# Patient Record
Sex: FEMALE | Race: ASIAN | NOT HISPANIC OR LATINO | URBAN - METROPOLITAN AREA
[De-identification: names, ages, dates, MRNs, and addresses within clinical notes are randomized per-mention and may not be internally consistent; named-entity substitution may affect disease eponyms.]

---

## 2021-08-05 ENCOUNTER — EMERGENCY (EMERGENCY)
Facility: HOSPITAL | Age: 21
LOS: 1 days | Discharge: HOME | End: 2021-08-05
Attending: EMERGENCY MEDICINE
Payer: COMMERCIAL

## 2021-08-05 VITALS
RESPIRATION RATE: 17 BRPM | TEMPERATURE: 98 F | HEIGHT: 62 IN | DIASTOLIC BLOOD PRESSURE: 68 MMHG | HEART RATE: 73 BPM | SYSTOLIC BLOOD PRESSURE: 116 MMHG | OXYGEN SATURATION: 100 % | WEIGHT: 110.01 LBS

## 2021-08-05 LAB — HCG UR QL: NEGATIVE — SIGNIFICANT CHANGE UP

## 2021-08-05 PROCEDURE — 99284 EMERGENCY DEPT VISIT MOD MDM: CPT

## 2021-08-05 RX ORDER — CEFTRIAXONE 500 MG/1
500 INJECTION, POWDER, FOR SOLUTION INTRAMUSCULAR; INTRAVENOUS ONCE
Refills: 0 | Status: COMPLETED | OUTPATIENT
Start: 2021-08-05 | End: 2021-08-05

## 2021-08-05 NOTE — ED PROVIDER NOTE - PHYSICAL EXAMINATION
Well Appearing, Nontoxic, NAD;  Symm Facies, PERRL 3mm, (-)Pallor, Anicteric, MMM;  RRR w/o m/g/r;   CTAB w/o w/r/r;   Abd soft, nt/nd, +bs;  No CVAT, nml external genitalia, purulent cervical discharge, (-)CMT/adnexa mass/tenderness;  No rash;  AOX3, Normal speech, normal strength/sensation/gait

## 2021-08-05 NOTE — ED PROVIDER NOTE - CLINICAL SUMMARY MEDICAL DECISION MAKING FREE TEXT BOX
------------ATTENDING NOTE------------  pt c/o 48 hrs of increased thick yellow vaginal discharge and mild midline pelvic ache, no fevers, complicated as recent unprotected vaginal intercourse, no fevers, exam w/ cervicitis and purulent uterine discharge, benign bimanual exam w/o CMT or adnexal mass/tenderness (resident Oren jennings), soft benign abd, no rash, no joint pain/swelling, empiric GC/CT tx, pt has close f/u tomorrow GYN, nml VS at SD, in depth dw pt about ddx, tx, mccormack, continued close outpt fu.  - Noble Aguero MD   ---------------------------------------------- ------------ATTENDING NOTE------------  pt c/o 48 hrs of increased thick yellow vaginal discharge and mild midline pelvic ache, no fevers, complicated as recent unprotected vaginal intercourse, no fevers, exam w/ cervicitis and purulent uterine discharge, benign bimanual exam w/o CMT or adnexal mass/tenderness (resident Oren jennings), soft benign abd, no rash, no joint pain/swelling, empiric GC/CT tx, no urinary symptoms and will await UCx (likely pyuria related to uterine discharge) , pt has close f/u tomorrow GYN, nml VS at SD, in depth dw pt about ddx, tx, mccormack, continued close outpt fu.  - Noble Aguero MD   ----------------------------------------------

## 2021-08-05 NOTE — ED PROVIDER NOTE - NSFOLLOWUPINSTRUCTIONS_ED_ALL_ED_FT
See your Gynecologist this week as scheduled for follow up -- call to discuss.    Use Acetaminophen and/or Ibuprofen as directed for pain -- see medication warnings.    Use Doxycycline as directed for infection -- see medication warnings.    See PELVIC PAIN information and return instructions given to you.    Seek immediate medical care for new/worsening symptoms/concerns.

## 2021-08-05 NOTE — ED PROVIDER NOTE - PATIENT PORTAL LINK FT
You can access the FollowMyHealth Patient Portal offered by [facility] by registering at the following website: [email or web address]. By joining [employer] [facility]’s FollowMyHealth portal, you will also be able to view your health information using other applications (apps) compatible with our system.

## 2021-08-05 NOTE — ED PROVIDER NOTE - RAPID ASSESSMENT
21y F with no PMHx, presents to the ED c/o pelvic cramping.  LMP July 22nd. Pt reports intermittent cramping s/p 400mg ibuprofen at 9pm. Was formerly constant. Pt denies cramping after menstrual period in the past. States she has been taking 800 mg ibuprofen daily for the pain. Denies dysuria, fevers, vaginal bleeding, abnormal vaginal discharge. No hx of STI/STD.     I, Radha Martinez (Scribe), have documented this rapid assessment note under the dictation of Lakisha Ventura (), which has been reviewed and affirmed to be accurate.  Patient was seen as a QDOC patient. The patient will be seen and further worked up in the main emergency department and their care will be completed by the main emergency department team along with a thorough physical exam. Receiving team will follow up on labs, analgesia, any clinical imaging, reassess and disposition as clinically indicated, all decisions regarding the progression of care will be made at their discretion. 21y F with no PMHx, presents to the ED c/o pelvic cramping.  LMP July 22nd. Pt reports intermittent cramping s/p 400mg ibuprofen at 9pm. Was formerly constant. Pt denies cramping after menstrual period in the past. States she has been taking a total of 800 mg ibuprofen daily for the pain, only 200mg at a time. Denies dysuria, fevers, vaginal bleeding, abnormal vaginal discharge. No hx of STI/STD.     IRadha (Ja), have documented this rapid assessment note under the dictation of Lakisha Ventura (), which has been reviewed and affirmed to be accurate.  Patient was seen as a QDOC patient. The patient will be seen and further worked up in the main emergency department and their care will be completed by the main emergency department team along with a thorough physical exam. Receiving team will follow up on labs, analgesia, any clinical imaging, reassess and disposition as clinically indicated, all decisions regarding the progression of care will be made at their discretion.    I,  , saw patient as a rapid assessment initially, rest of care performed by another attending, and rapid assessment documented by ja in my presence.

## 2021-08-06 VITALS
SYSTOLIC BLOOD PRESSURE: 116 MMHG | DIASTOLIC BLOOD PRESSURE: 93 MMHG | TEMPERATURE: 98 F | RESPIRATION RATE: 18 BRPM | OXYGEN SATURATION: 100 % | HEART RATE: 88 BPM

## 2021-08-06 LAB
APPEARANCE UR: CLEAR — SIGNIFICANT CHANGE UP
BACTERIA # UR AUTO: ABNORMAL
BILIRUB UR-MCNC: NEGATIVE — SIGNIFICANT CHANGE UP
COLOR SPEC: SIGNIFICANT CHANGE UP
DIFF PNL FLD: ABNORMAL
EPI CELLS # UR: 1 /HPF — SIGNIFICANT CHANGE UP
GLUCOSE UR QL: NEGATIVE — SIGNIFICANT CHANGE UP
HIV 1 & 2 AB SERPL IA.RAPID: SIGNIFICANT CHANGE UP
HYALINE CASTS # UR AUTO: 2 /LPF — SIGNIFICANT CHANGE UP (ref 0–2)
KETONES UR-MCNC: NEGATIVE — SIGNIFICANT CHANGE UP
LEUKOCYTE ESTERASE UR-ACNC: ABNORMAL
NITRITE UR-MCNC: NEGATIVE — SIGNIFICANT CHANGE UP
PH UR: 5.5 — SIGNIFICANT CHANGE UP (ref 5–8)
PROT UR-MCNC: NEGATIVE — SIGNIFICANT CHANGE UP
RBC CASTS # UR COMP ASSIST: 2 /HPF — SIGNIFICANT CHANGE UP (ref 0–4)
SP GR SPEC: 1.01 — SIGNIFICANT CHANGE UP (ref 1.01–1.02)
T PALLIDUM AB TITR SER: NEGATIVE — SIGNIFICANT CHANGE UP
UROBILINOGEN FLD QL: NEGATIVE — SIGNIFICANT CHANGE UP
WBC UR QL: 30 /HPF — HIGH (ref 0–5)

## 2021-08-06 PROCEDURE — 81001 URINALYSIS AUTO W/SCOPE: CPT

## 2021-08-06 PROCEDURE — 87491 CHLMYD TRACH DNA AMP PROBE: CPT

## 2021-08-06 PROCEDURE — 87591 N.GONORRHOEAE DNA AMP PROB: CPT

## 2021-08-06 PROCEDURE — 81025 URINE PREGNANCY TEST: CPT

## 2021-08-06 PROCEDURE — 87077 CULTURE AEROBIC IDENTIFY: CPT

## 2021-08-06 PROCEDURE — 99283 EMERGENCY DEPT VISIT LOW MDM: CPT | Mod: 25

## 2021-08-06 PROCEDURE — 96372 THER/PROPH/DIAG INJ SC/IM: CPT

## 2021-08-06 PROCEDURE — 86780 TREPONEMA PALLIDUM: CPT

## 2021-08-06 PROCEDURE — 87086 URINE CULTURE/COLONY COUNT: CPT

## 2021-08-06 PROCEDURE — 86703 HIV-1/HIV-2 1 RESULT ANTBDY: CPT

## 2021-08-06 PROCEDURE — 87186 SC STD MICRODIL/AGAR DIL: CPT

## 2021-08-06 RX ADMIN — CEFTRIAXONE 500 MILLIGRAM(S): 500 INJECTION, POWDER, FOR SOLUTION INTRAMUSCULAR; INTRAVENOUS at 00:13

## 2021-08-06 RX ADMIN — Medication 100 MILLIGRAM(S): at 00:12

## 2021-08-06 NOTE — ED ADULT NURSE NOTE - OBJECTIVE STATEMENT
21 year old female c/o pelvic pain. Pt A+Ox3, reports lower middle abdominal and pelvic pain, worsening today. LMP 7/22. Pt reports new sexual partner, uses condoms, and recently took 2 separate doses of Plan B when the condom broke. Pt reports increasing vaginal discharge, and denies odor or color. Upon assessment, pt presents well-appearing, and reports minimal relief with Tylenol. Pt denies headache, dizziness, chest pain, SOB, N/V, abdominal pain, urinary or bowel symptoms.

## 2021-08-06 NOTE — ED ADULT NURSE NOTE - NSIMPLEMENTINTERV_GEN_ALL_ED
Implemented All Universal Safety Interventions:  [place] to call system. Call bell, personal items and telephone within reach. Instruct patient to call for assistance. Room bathroom lighting operational. Non-slip footwear when patient is off stretcher. Physically safe environment: no spills, clutter or unnecessary equipment. Stretcher in lowest position, wheels locked, appropriate side rails in place.

## 2021-08-07 LAB
C TRACH RRNA SPEC QL NAA+PROBE: DETECTED
N GONORRHOEA RRNA SPEC QL NAA+PROBE: SIGNIFICANT CHANGE UP
SPECIMEN SOURCE: SIGNIFICANT CHANGE UP

## 2021-08-07 NOTE — ED POST DISCHARGE NOTE - DETAILS
8/7/21 at 1748: Received call just now from [employer] regarding critical result of REACTIVE Chlamydia result. Spoke w/ patient, made here aware of positive result, counseled her to continue doxycycline as prescribed as this is appropriate tx and f/u with GYN once completed abx for re-eval. Additionally counseled her to inform any recent sexual partners of positive result so they may pursue testing/treatment. Pt acknowledged understanding. - Piotr Gonzalez PA-C

## 2021-08-07 NOTE — ED POST DISCHARGE NOTE - RESULT SUMMARY
Pt called for syphilis and GC/chlamydia results. Informed syphilis negative, GC/Chlamydia pending still. Pt appreciative. - Piotr Gonzalez PA-C 8/7/21 at 1258: Pt called for syphilis and GC/chlamydia results. Informed syphilis negative, GC/Chlamydia pending still. Pt appreciative. - Piotr Gonzalez PA-C

## 2021-08-07 NOTE — ED POST DISCHARGE NOTE - ADDITIONAL DOCUMENTATION
8/9: colony count <50-99k, recommended recheck urine culture with pmd/obgyn. no symptoms of uti, recommended complete course of Doxy for chalmydia. - Lizzy Londono PA-C

## 2021-12-07 ENCOUNTER — EMERGENCY (EMERGENCY)
Facility: HOSPITAL | Age: 21
LOS: 1 days | Discharge: HOME | End: 2021-12-07
Attending: EMERGENCY MEDICINE
Payer: COMMERCIAL

## 2021-12-07 VITALS
HEART RATE: 136 BPM | OXYGEN SATURATION: 97 % | WEIGHT: 108.91 LBS | SYSTOLIC BLOOD PRESSURE: 111 MMHG | HEIGHT: 62 IN | RESPIRATION RATE: 20 BRPM | DIASTOLIC BLOOD PRESSURE: 68 MMHG | TEMPERATURE: 103 F

## 2021-12-07 PROCEDURE — 99284 EMERGENCY DEPT VISIT MOD MDM: CPT

## 2021-12-08 VITALS
OXYGEN SATURATION: 100 % | HEART RATE: 87 BPM | TEMPERATURE: 98 F | RESPIRATION RATE: 20 BRPM | SYSTOLIC BLOOD PRESSURE: 97 MMHG | DIASTOLIC BLOOD PRESSURE: 66 MMHG

## 2021-12-08 PROBLEM — Z78.9 OTHER SPECIFIED HEALTH STATUS: Chronic | Status: ACTIVE | Noted: 2021-08-06

## 2021-12-08 LAB
ALBUMIN SERPL ELPH-MCNC: 4.2 G/DL — SIGNIFICANT CHANGE UP (ref 3.3–5)
ALP SERPL-CCNC: 60 U/L — SIGNIFICANT CHANGE UP (ref 40–120)
ALT FLD-CCNC: 13 U/L — SIGNIFICANT CHANGE UP (ref 10–45)
ANION GAP SERPL CALC-SCNC: 13 MMOL/L — SIGNIFICANT CHANGE UP (ref 5–17)
APPEARANCE UR: ABNORMAL
AST SERPL-CCNC: 22 U/L — SIGNIFICANT CHANGE UP (ref 10–40)
BASOPHILS # BLD AUTO: 0.04 K/UL — SIGNIFICANT CHANGE UP (ref 0–0.2)
BASOPHILS NFR BLD AUTO: 0.4 % — SIGNIFICANT CHANGE UP (ref 0–2)
BILIRUB SERPL-MCNC: 0.5 MG/DL — SIGNIFICANT CHANGE UP (ref 0.2–1.2)
BILIRUB UR-MCNC: NEGATIVE — SIGNIFICANT CHANGE UP
BUN SERPL-MCNC: 13 MG/DL — SIGNIFICANT CHANGE UP (ref 7–23)
CALCIUM SERPL-MCNC: 9.5 MG/DL — SIGNIFICANT CHANGE UP (ref 8.4–10.5)
CHLORIDE SERPL-SCNC: 101 MMOL/L — SIGNIFICANT CHANGE UP (ref 96–108)
CO2 SERPL-SCNC: 20 MMOL/L — LOW (ref 22–31)
COLOR SPEC: YELLOW — SIGNIFICANT CHANGE UP
CREAT SERPL-MCNC: 0.87 MG/DL — SIGNIFICANT CHANGE UP (ref 0.5–1.3)
DIFF PNL FLD: ABNORMAL
EOSINOPHIL # BLD AUTO: 0.01 K/UL — SIGNIFICANT CHANGE UP (ref 0–0.5)
EOSINOPHIL NFR BLD AUTO: 0.1 % — SIGNIFICANT CHANGE UP (ref 0–6)
GLUCOSE SERPL-MCNC: 100 MG/DL — HIGH (ref 70–99)
GLUCOSE UR QL: NEGATIVE — SIGNIFICANT CHANGE UP
HCT VFR BLD CALC: 40.3 % — SIGNIFICANT CHANGE UP (ref 34.5–45)
HGB BLD-MCNC: 13.4 G/DL — SIGNIFICANT CHANGE UP (ref 11.5–15.5)
IMM GRANULOCYTES NFR BLD AUTO: 0.4 % — SIGNIFICANT CHANGE UP (ref 0–1.5)
KETONES UR-MCNC: NEGATIVE — SIGNIFICANT CHANGE UP
LEUKOCYTE ESTERASE UR-ACNC: ABNORMAL
LYMPHOCYTES # BLD AUTO: 1.8 K/UL — SIGNIFICANT CHANGE UP (ref 1–3.3)
LYMPHOCYTES # BLD AUTO: 17.4 % — SIGNIFICANT CHANGE UP (ref 13–44)
MAGNESIUM SERPL-MCNC: 2 MG/DL — SIGNIFICANT CHANGE UP (ref 1.6–2.6)
MCHC RBC-ENTMCNC: 29.1 PG — SIGNIFICANT CHANGE UP (ref 27–34)
MCHC RBC-ENTMCNC: 33.3 GM/DL — SIGNIFICANT CHANGE UP (ref 32–36)
MCV RBC AUTO: 87.6 FL — SIGNIFICANT CHANGE UP (ref 80–100)
MONOCYTES # BLD AUTO: 0.64 K/UL — SIGNIFICANT CHANGE UP (ref 0–0.9)
MONOCYTES NFR BLD AUTO: 6.2 % — SIGNIFICANT CHANGE UP (ref 2–14)
NEUTROPHILS # BLD AUTO: 7.84 K/UL — HIGH (ref 1.8–7.4)
NEUTROPHILS NFR BLD AUTO: 75.5 % — SIGNIFICANT CHANGE UP (ref 43–77)
NITRITE UR-MCNC: POSITIVE
NRBC # BLD: 0 /100 WBCS — SIGNIFICANT CHANGE UP (ref 0–0)
PH UR: 6.5 — SIGNIFICANT CHANGE UP (ref 5–8)
PHOSPHATE SERPL-MCNC: 1.5 MG/DL — LOW (ref 2.5–4.5)
PLATELET # BLD AUTO: 179 K/UL — SIGNIFICANT CHANGE UP (ref 150–400)
POTASSIUM SERPL-MCNC: 4.1 MMOL/L — SIGNIFICANT CHANGE UP (ref 3.5–5.3)
POTASSIUM SERPL-SCNC: 4.1 MMOL/L — SIGNIFICANT CHANGE UP (ref 3.5–5.3)
PROCALCITONIN SERPL-MCNC: 0.19 NG/ML — HIGH (ref 0.02–0.1)
PROT SERPL-MCNC: 7.4 G/DL — SIGNIFICANT CHANGE UP (ref 6–8.3)
PROT UR-MCNC: 100 — SIGNIFICANT CHANGE UP
RAPID RVP RESULT: SIGNIFICANT CHANGE UP
RBC # BLD: 4.6 M/UL — SIGNIFICANT CHANGE UP (ref 3.8–5.2)
RBC # FLD: 12.9 % — SIGNIFICANT CHANGE UP (ref 10.3–14.5)
SARS-COV-2 RNA SPEC QL NAA+PROBE: SIGNIFICANT CHANGE UP
SODIUM SERPL-SCNC: 134 MMOL/L — LOW (ref 135–145)
SP GR SPEC: 1.02 — SIGNIFICANT CHANGE UP (ref 1.01–1.02)
UROBILINOGEN FLD QL: NEGATIVE — SIGNIFICANT CHANGE UP
WBC # BLD: 10.37 K/UL — SIGNIFICANT CHANGE UP (ref 3.8–10.5)
WBC # FLD AUTO: 10.37 K/UL — SIGNIFICANT CHANGE UP (ref 3.8–10.5)

## 2021-12-08 PROCEDURE — 99284 EMERGENCY DEPT VISIT MOD MDM: CPT | Mod: 25

## 2021-12-08 PROCEDURE — 71046 X-RAY EXAM CHEST 2 VIEWS: CPT

## 2021-12-08 PROCEDURE — 80053 COMPREHEN METABOLIC PANEL: CPT

## 2021-12-08 PROCEDURE — 71046 X-RAY EXAM CHEST 2 VIEWS: CPT | Mod: 26

## 2021-12-08 PROCEDURE — 85025 COMPLETE CBC W/AUTO DIFF WBC: CPT

## 2021-12-08 PROCEDURE — 87186 SC STD MICRODIL/AGAR DIL: CPT

## 2021-12-08 PROCEDURE — 83735 ASSAY OF MAGNESIUM: CPT

## 2021-12-08 PROCEDURE — 0225U NFCT DS DNA&RNA 21 SARSCOV2: CPT

## 2021-12-08 PROCEDURE — 84145 PROCALCITONIN (PCT): CPT

## 2021-12-08 PROCEDURE — 84100 ASSAY OF PHOSPHORUS: CPT

## 2021-12-08 PROCEDURE — 81001 URINALYSIS AUTO W/SCOPE: CPT

## 2021-12-08 PROCEDURE — 87086 URINE CULTURE/COLONY COUNT: CPT

## 2021-12-08 RX ORDER — IBUPROFEN 200 MG
400 TABLET ORAL ONCE
Refills: 0 | Status: COMPLETED | OUTPATIENT
Start: 2021-12-08 | End: 2021-12-08

## 2021-12-08 RX ORDER — AZTREONAM 2 G
1 VIAL (EA) INJECTION
Qty: 28 | Refills: 0
Start: 2021-12-08 | End: 2021-12-21

## 2021-12-08 RX ADMIN — Medication 400 MILLIGRAM(S): at 01:05

## 2021-12-08 RX ADMIN — Medication 1 TABLET(S): at 02:35

## 2021-12-08 NOTE — ED PROVIDER NOTE - GASTROINTESTINAL NEGATIVE STATEMENT, MLM
Patient with one or more new problems requiring additional work-up/treatment. no abdominal pain, no diarrhea, no nausea and no vomiting.

## 2021-12-08 NOTE — ED PROVIDER NOTE - OBJECTIVE STATEMENT
Patient presenting complaining of fever over last two days.  Initially had lower back pains which resolved.  Fevers spiked up to 103F at home which concerned her so presenting for evaluation.  No longer having back pains.  Vaccinated against COVID-19 with Moderna.  Did have a COVID exposed (not tested positive) classmate which she has not been in close proximity to.  Denying cough, sore throat, headaches, neck stiffness, rashes, abdominal pains, nausea, vomiting, diarrhea, dysuria, frequency, urgency, hesitancy, hematuria.  Did have chlamydia a few months ago, completed antibiotics for this.  Seen at urgent care earlier today with negative rapid flu/COVID.

## 2021-12-08 NOTE — ED PROVIDER NOTE - ATTENDING CONTRIBUTION TO CARE
Patient seen and evaluated with resident/NP/PA, however HPI, ROS, PE and MDM as documented authored by myself unless otherwise noted- Terry Gtz MD

## 2021-12-08 NOTE — ED PROVIDER NOTE - NSFOLLOWUPINSTRUCTIONS_ED_ALL_ED_FT
Urinary Tract Infection    A urinary tract infection (UTI) is an infection of any part of the urinary tract, which includes the kidneys, ureters, bladder, and urethra. Risk factors include ignoring your need to urinate, wiping back to front if female, being an uncircumcised male, and having diabetes or a weak immune system. Symptoms include frequent urination, pain or burning with urination, foul smelling urine, cloudy urine, pain in the lower abdomen, blood in the urine, and fever. If you were prescribed an antibiotic medicine, take it as told by your health care provider. Do not stop taking the antibiotic even if you start to feel better.    SEEK IMMEDIATE MEDICAL CARE IF YOU HAVE ANY OF THE FOLLOWING SYMPTOMS: severe back or abdominal pain, fever, inability to keep fluids or medicine down, dizziness/lightheadedness, or a change in mental status.     You back pain may be related to your infection involving your kidneys. Please complete your course of antibiotics.

## 2021-12-08 NOTE — ED PROVIDER NOTE - PHYSICAL EXAMINATION
Exam:  General: Patient well appearing, vital signs within normal limits  HEENT: airway patent with moist mucous membranes  Cardiac: RRR S1/S2 with strong peripheral pulses  Respiratory: lungs clear without respiratory distress  GI: abdomen soft, non tender, non distended  : no flank tenderness/CVA tenderness  Neuro: no gross neurologic deficits, no meningismus  Skin: warm, well perfused  Psych: normal mood and affect

## 2021-12-08 NOTE — ED ADULT NURSE NOTE - OBJECTIVE STATEMENT
20 y/o female presenting to ED Ambulatory, A&OX3, complaining of fevers. Pt reports having fevers since last night along with associated back pain. Pt denies any additional symptoms. Pt denies chest pain, shortness of breath, n/v/d, urinary symptoms, pain, cough, weakness, chills, dizziness, sick contacts. pt able to speak in full complete sentences, breathing spontaneous unlabored. abd soft nontender. no cva tenderness on back. Safety and comfort measures provided, bed locked and in lowest position, side rails up for safety. Call bell within reach. Awaiting results. 22 y/o female presenting to ED Ambulatory, A&OX3, complaining of fevers. Pt reports having fevers since last night along with associated back pain. Pt denies any additional symptoms. tested for covid and flu negative outpatient urgent care.  Pt denies chest pain, shortness of breath, n/v/d, urinary symptoms, pain, sorethroat, cough, weakness, chills, dizziness, sick contacts. pt able to speak in full complete sentences, breathing spontaneous unlabored. abd soft nontender. no cva tenderness on back. Safety and comfort measures provided, bed locked and in lowest position, side rails up for safety. Call bell within reach. Awaiting results.

## 2021-12-08 NOTE — ED PROVIDER NOTE - PROGRESS NOTE DETAILS
Raymundo Lawson MD: Patient feeling well. Urine is grossly positive and symptoms may be related to pyelonephritis despite negative CVA tenderness. Will d/c with oral antibiotics.

## 2021-12-08 NOTE — ED PROVIDER NOTE - PATIENT PORTAL LINK FT
You can access the FollowMyHealth Patient Portal offered by [facility] by registering at the following website: [email or web address]. By joining [employer]’s FollowMyHealth portal, you will also be able to view your health information using other applications (apps) compatible with our system.

## 2021-12-08 NOTE — ED PROVIDER NOTE - CLINICAL SUMMARY MEDICAL DECISION MAKING FREE TEXT BOX
Patient presenting complaining of fevers over last 24-48 hours without other symptoms, and no focal findings on exam to explain symptoms.  Very well/non toxic appearing.  COVID-19 and influenza remain in differential despite rapid testing at .  Will obtain screening labs for possible sepsis, repeat RVP in Emergency Department, CXR, UA, treat fever and re-evaluate.

## 2021-12-09 NOTE — ED POST DISCHARGE NOTE - DETAILS
Pt requiring abx change, left VM for call back. - Kristal Farias PA-C Called back by patient, advised to stop bactrim and take cefdinir course to completion. - Kristal Farias PA-C

## 2021-12-10 RX ORDER — CEFDINIR 250 MG/5ML
1 POWDER, FOR SUSPENSION ORAL
Qty: 28 | Refills: 0
Start: 2021-12-10 | End: 2021-12-23

## 2024-07-30 NOTE — ED ADULT NURSE NOTE - NSFALLRSKUNASSIST_ED_ALL_ED
[No Acute Distress] : no acute distress [Well Nourished] : well nourished [Well Developed] : well developed no [Well-Appearing] : well-appearing [Normal Voice/Communication] : normal voice/communication [Normal Sclera/Conjunctiva] : normal sclera/conjunctiva [Normal Outer Ear/Nose] : the outer ears and nose were normal in appearance [Supple] : supple [No Respiratory Distress] : no respiratory distress  [No Accessory Muscle Use] : no accessory muscle use [Clear to Auscultation] : lungs were clear to auscultation bilaterally [Normal Rate] : normal rate  [Normal S1, S2] : normal S1 and S2 [No Edema] : there was no peripheral edema [Soft] : abdomen soft [Non Tender] : non-tender [Non-distended] : non-distended [No CVA Tenderness] : no CVA  tenderness [No Joint Swelling] : no joint swelling [No Rash] : no rash [Normal Gait] : normal gait [Speech Grossly Normal] : speech grossly normal [Normal Affect] : the affect was normal [Normal Insight/Judgement] : insight and judgment were intact [de-identified] : Obese body habitus  [de-identified] : Mid-lumbosacral tenderness to deep palpation